# Patient Record
(demographics unavailable — no encounter records)

---

## 2024-10-10 NOTE — PHYSICAL EXAM
[Well Developed] : well developed [No Acute Distress] : no acute distress [Normal] : alert and oriented, normal memory [de-identified] : Normocephalic and atraumatic

## 2024-10-10 NOTE — PHYSICAL EXAM
[Well Developed] : well developed [No Acute Distress] : no acute distress [Normal] : alert and oriented, normal memory [de-identified] : Normocephalic and atraumatic

## 2024-10-10 NOTE — HISTORY OF PRESENT ILLNESS
[FreeTextEntry1] : Patient played football and wrestled throughout high school. Since graduation, without training his weight has increased by at least 25 lbs. He has been trying to make lifestyle changes.  He denies any chest pain, dizziness, syncope, near-syncope, SOB, edema, orthopnea or PND.

## 2024-10-10 NOTE — REASON FOR VISIT
[FreeTextEntry1] : LISSETTE TREVINO is an 18-year-old M presents here for cardiac follow-up with concerns regarding elevated blood pressure.  Has been away at college for the last few months, where he has been exercising periodically, eating cafeteria food but not monitoring his blood pressure.  Was referred by his PMD to cardiology due to elevated BP during visits there. Patient endorses increased stress after recent graduation and breakup with his girlfriend shortly prior to MD visit.

## 2024-10-10 NOTE — ASSESSMENT
[FreeTextEntry1] : ECG: Normal sinus rhythm at 83 bpm.  Minor nonspecific T wave normalities.  ECG obtained to assist in diagnosis and management of assessed problem(s).   Echocardiogram 7/16/24: LV is mildly dilated with normal wall thickness and normal function LVEF 55 to 60% Other chamber sizes and valves are all within normal limits.  IMPRESSION:  18-year-old male with no hx of CV disease here for follow-up regarding elevated blood pressures.   1.  No data about what his blood pressure has been over the last few months but elevated here today. 2. Echocardiogram showed no evidence of hypertensive heart disease.      LV was mildly dilated probably normal for his body habitus.  3.  Despite exercising and "watching his diet", weight is up 8 pounds.  PLAN:  1. Advised to obtain a new BP machine with a large cuff and monitor blood pressure regularly.  I am to be contacted with that information within 2 weeks.  2. Blood pressures can be checked three times weekly if he is able to while at college. Advised to sit quietly for 2 minutes before checking BP. Was checking BP at home shortly after playing video games, or as soon as he is just waking up. Advised that these are not ideal times to be checking his blood pressure.   3. Avoid salt in his diet.   4. Pt advised to exercise for at least 30 minutes most days of the week.  He was cautioned about the need for gradual warm up and cool down.  Any cardiac symptoms such as chest pain, palpitations or new shortness of breath should be reported.  5. Dietary and lifestyle changes for weight loss strongly encouraged.   Clinical follow-up in November when he returns from college.

## 2024-12-30 NOTE — REASON FOR VISIT
[FreeTextEntry1] : LISSETTE TREVINO is an 18-year-old M presents here for cardiac follow-up with concerns regarding elevated blood pressure.  Has been away at college for the last few months, where he has been exercising periodically, eating cafeteria food and is now monitoring his blood pressure with an appropriate sized cuff. Average blood pressure 145/84.  Patient endorses increased stress after recent graduation and breakup with his girlfriend shortly prior to MD visit.

## 2024-12-30 NOTE — ASSESSMENT
[FreeTextEntry1] : ECG: Sinus bradycardia at 48 bpm with borderline LVH nonspecific T wave abnormalities.  ECG obtained to assist in diagnosis and management of assessed problem(s).   Echocardiogram 7/16/24: LV is mildly dilated with normal wall thickness and normal function LVEF 55 to 60% Other chamber sizes and valves are all within normal limits.  IMPRESSION:  18-year-old male with no hx of CV disease here for follow-up regarding elevated blood pressures.   1.  Home blood pressure data shows that he has type II hypertension 145/84 average.  2. Echocardiogram showed no evidence of hypertensive heart disease.      LV was mildly dilated probably normal for his body habitus.  3.  Weight has improved but his BMI is still 36.3.  PLAN:  1.  As was discussed with the patient and his parents in attendance, it is time to begin treating his blood pressure.  Will begin valsartan HCT 80/12.5.  The medication indications risk benefits alternatives were all reviewed. Patient will continue to monitor blood pressure daily He will obtain a BMP in approximately 3 to 4 weeks. He will call us in 4 weeks to review his blood pressure measurements and the BMP.  2.  Instructed the patient about the benefits of a diet that restricts portion sizes, increases frequency of meals and consists of  vegetables, (more green and leafy),fruit and nuts, whole grains, lean proteins and limits carbohydrates and meat and dairy fats  3.  2400 mg sodium restriction  4. Pt advised to exercise for at least 30 minutes most days of the week.  He was cautioned about the need for gradual warm up and cool down.  Any cardiac symptoms such as chest pain, palpitations or new shortness of breath should be reported.  5. Dietary and lifestyle changes for weight loss strongly encouraged.   Clinical follow-up when he returns from college.

## 2024-12-30 NOTE — PHYSICAL EXAM
[Well Developed] : well developed [No Acute Distress] : no acute distress [Normal] : alert and oriented, normal memory [de-identified] : Normocephalic and atraumatic

## 2024-12-30 NOTE — PHYSICAL EXAM
[Well Developed] : well developed [No Acute Distress] : no acute distress [Normal] : alert and oriented, normal memory [de-identified] : Normocephalic and atraumatic

## 2025-05-19 NOTE — ASSESSMENT
[FreeTextEntry1] :  ECG: Sinus bradycardia at 51 bpm with borderline LVH nonspecific T wave abnormalities.  ECG obtained to assist in diagnosis and management of assessed problem(s).   Echocardiogram 7/16/24: LV is mildly dilated with normal wall thickness and normal function LVEF 55 to 60% Other chamber sizes and valves are all within normal limits.  IMPRESSION:  19-year-old male with no hx of CV disease here for follow-up regarding elevated blood pressures.   1.  Home blood pressure indicates substantial improvement with weight loss diet exercise.  2. Echocardiogram showed no evidence of hypertensive heart disease.      LV was mildly dilated probably normal for his body habitus.  3.  Weight has improved but his BMI is 33.6 down from 36.3  PLAN:  1.  Would not yet cut back on antihypertensive regimen but suspect that the patient is getting close.  If blood pressure goes much lower or certainly Karen were to become symptomatic would eliminate the hydrochlorothiazide component of his valsartan HCT.  2.  Instructed the patient about the benefits of a diet that restricts portion sizes, increases frequency of meals and consists of  vegetables, (more green and leafy),fruit and nuts, whole grains, lean proteins and limits carbohydrates and meat and dairy fats  3.  2400 mg sodium restriction  4. Pt advised to exercise for at least 30 minutes most days of the week.  He was cautioned about the need for gradual warm up and cool down.  Any cardiac symptoms such as chest pain, palpitations or new shortness of breath should be reported.  5. Dietary and lifestyle changes for weight loss strongly encouraged.   Clinical follow-up when he returns from college.

## 2025-05-19 NOTE — PHYSICAL EXAM
[Well Developed] : well developed [No Acute Distress] : no acute distress [Normal] : alert and oriented, normal memory [de-identified] : Normocephalic and atraumatic

## 2025-05-19 NOTE — REASON FOR VISIT
[FreeTextEntry1] : LISSETTE TREVINO is an 19-year-old M presents here for cardiac follow-up with concerns regarding elevated blood pressure.  Has been away at college for the last few months, where he has been exercising more regularly, increase attentiveness to diet and has lost 21 pounds. His home blood pressure averages now 119/46 representing a dramatic improvement from 145/84.

## 2025-05-19 NOTE — PHYSICAL EXAM
[Well Developed] : well developed [No Acute Distress] : no acute distress [Normal] : alert and oriented, normal memory [de-identified] : Normocephalic and atraumatic